# Patient Record
Sex: MALE | Race: WHITE | NOT HISPANIC OR LATINO | URBAN - METROPOLITAN AREA
[De-identification: names, ages, dates, MRNs, and addresses within clinical notes are randomized per-mention and may not be internally consistent; named-entity substitution may affect disease eponyms.]

---

## 2024-02-09 ENCOUNTER — APPOINTMENT (OUTPATIENT)
Dept: DERMATOLOGY | Facility: CLINIC | Age: 59
End: 2024-02-09
Payer: COMMERCIAL

## 2024-09-24 ENCOUNTER — OFFICE VISIT (OUTPATIENT)
Dept: URGENT CARE | Age: 59
End: 2024-09-24
Payer: COMMERCIAL

## 2024-09-24 VITALS
RESPIRATION RATE: 16 BRPM | DIASTOLIC BLOOD PRESSURE: 88 MMHG | HEART RATE: 67 BPM | BODY MASS INDEX: 32.14 KG/M2 | TEMPERATURE: 97.7 F | OXYGEN SATURATION: 97 % | SYSTOLIC BLOOD PRESSURE: 145 MMHG | HEIGHT: 66 IN | WEIGHT: 200 LBS

## 2024-09-24 DIAGNOSIS — L50.9 HIVES: Primary | ICD-10-CM

## 2024-09-24 RX ORDER — METHYLPREDNISOLONE 4 MG/1
TABLET ORAL
Qty: 21 TABLET | Refills: 0 | Status: SHIPPED | OUTPATIENT
Start: 2024-09-24 | End: 2024-10-01

## 2024-09-24 NOTE — PROGRESS NOTES
"Subjective   Patient ID: Jamal Pradhan is a 59 y.o. male. They present today with a chief complaint of Rash (Started in groin and now in armpits and chest.  C/o itching).    History of Present Illness  HPI  Pt is a 59 yr old male who presents  with rash started in his groin now under arms and on abd and neck.  He said his wife did change detergents but didn't think it would be from that.  No new foods or medications.  It started a few days ago after cutting the grass  Past Medical History  Allergies as of 09/24/2024    (No Known Allergies)       (Not in a hospital admission)       No past medical history on file.    No past surgical history on file.         Review of Systems  Review of Systems  Gen: No fatigue, fever, sweats.  Head: No headache, trauma.  Eyes: No vision loss, double vision, drainage, eye pain.  ENT: No hearing changes, pain, epistaxis, congestion  Cardiac: No chest pain  Pulmonary: No shortness of breath,  pleuritic pain,   Heme/lymph: No swollen glands  GI: No abdominal pain, nausea, vomiting, diarrhea  : No  dysuria, frequency, urgency, hematuria  Musculoskeletal: No limb pain, joint pain, back pain, joint swelling or stiffness.  Skin: + rashes, pruritus, lumps, lesions.  Neuro: No Numbness, tingling, or weakness.  Psych: No  anxiety     Review of systems is otherwise negative unless stated above or in history of present illness.                             Objective    Vitals:    09/24/24 1451   BP: 145/88   BP Location: Left arm   Patient Position: Sitting   BP Cuff Size: Adult long   Pulse: 67   Resp: 16   Temp: 36.5 °C (97.7 °F)   TempSrc: Oral   SpO2: 97%   Weight: 90.7 kg (200 lb)   Height: 1.676 m (5' 6\")     No LMP for male patient.    Physical Exam  General: Vital signs stable, Pt is alert, no acute distress  Eyes: Conjunctiva normal, PERRL, EOMs intact  HENMT: Normocephalic, atraumatic, external ears and nose normal, no scars or masses.  No mastoid tenderness. Trachea is midline. " No meningeal signs, negative Kernig and Brudzinski, moves neck freely.  No sinus tenderness  Resp: Respiratory effort is normal, no retractions, no stridor. Lungs CTA, no wheezes or rhonchi  CV: Heart is regular rate and rhythm.   Skin: + patchy rash to groins, axilla, abdomin and neck red and raised itchy in nature  Skel: full range of motion of upper and lower extremities.   Neuro: Normal gait, CN II-XII intact, no motor or sensory changes.  Psych: Alert and oriented ×3, judgment is appropriate, normal mood and affect   Procedures    Point of Care Test & Imaging Results from this visit  No results found for this visit on 09/24/24.   No results found.    Diagnostic study results (if any) were reviewed by [facility].    Assessment/Plan   Allergies, medications, history, and pertinent labs/EKGs/Imaging reviewed by FRANNIE Curtis-CNP.     Medical Decision Making  Based on history and physical exam, findings suggestive of contact dermatitis.  No evidence of bacterial infection or systematic allergic reaction. Will start on oral and topical treatments.  Encouraged to take a daily antihistamine and continue with symptomatic and supportive care measures.  Avoid heat exposures and don't scratch.  Follow-up with PCP in 4-5 days if symptoms persist, return with any new or worsening symptoms. Patient verbalized understanding and agreeable with plan.      Orders and Diagnoses  There are no diagnoses linked to this encounter.    Medical Admin Record      Patient disposition: Home    Electronically signed by [facility]  3:01 PM

## 2024-09-24 NOTE — PATIENT INSTRUCTIONS
Steroids were called into your pharmacy take as directed.  You may take zyrtec daily also.  If not better see dermatology

## 2024-10-15 ENCOUNTER — APPOINTMENT (OUTPATIENT)
Dept: PRIMARY CARE | Facility: CLINIC | Age: 59
End: 2024-10-15
Payer: COMMERCIAL

## 2025-01-06 ENCOUNTER — APPOINTMENT (OUTPATIENT)
Dept: DERMATOLOGY | Facility: CLINIC | Age: 60
End: 2025-01-06
Payer: COMMERCIAL

## 2025-01-06 DIAGNOSIS — L82.1 SEBORRHEIC KERATOSIS: ICD-10-CM

## 2025-01-06 DIAGNOSIS — L24.89 IRRITANT CONTACT DERMATITIS DUE TO OTHER AGENTS: ICD-10-CM

## 2025-01-06 DIAGNOSIS — L21.9 SEBORRHEIC DERMATITIS: ICD-10-CM

## 2025-01-06 DIAGNOSIS — D22.9 MULTIPLE BENIGN NEVI: Primary | ICD-10-CM

## 2025-01-06 PROCEDURE — 99214 OFFICE O/P EST MOD 30 MIN: CPT | Performed by: STUDENT IN AN ORGANIZED HEALTH CARE EDUCATION/TRAINING PROGRAM

## 2025-01-06 RX ORDER — KETOCONAZOLE 20 MG/G
CREAM TOPICAL
Qty: 60 G | Refills: 11 | Status: SHIPPED | OUTPATIENT
Start: 2025-01-06

## 2025-01-06 RX ORDER — KETOCONAZOLE 20 MG/G
1 CREAM TOPICAL
COMMUNITY
Start: 2019-05-24

## 2025-01-06 RX ORDER — METOPROLOL TARTRATE 25 MG/1
25 TABLET, FILM COATED ORAL 2 TIMES DAILY
COMMUNITY

## 2025-01-06 RX ORDER — AMLODIPINE BESYLATE 5 MG/1
5 TABLET ORAL 2 TIMES DAILY
COMMUNITY

## 2025-01-06 RX ORDER — ALBUTEROL SULFATE 90 UG/1
2 INHALANT RESPIRATORY (INHALATION) EVERY 4 HOURS PRN
COMMUNITY
Start: 2023-07-05

## 2025-01-06 RX ORDER — CLOBETASOL PROPIONATE 0.5 MG/G
CREAM TOPICAL 2 TIMES DAILY
COMMUNITY
Start: 2024-12-14

## 2025-01-06 ASSESSMENT — DERMATOLOGY PATIENT ASSESSMENT
HAVE YOU HAD OR DO YOU HAVE VASCULAR DISEASE: NO
DO YOU USE A TANNING BED: NO
DO YOU HAVE ANY NEW OR CHANGING LESIONS: NO
DO YOU USE SUNSCREEN: OCCASIONALLY
HAVE YOU HAD OR DO YOU HAVE A STAPH INFECTION: NO
ARE YOU AN ORGAN TRANSPLANT RECIPIENT: NO

## 2025-01-06 ASSESSMENT — DERMATOLOGY QUALITY OF LIFE (QOL) ASSESSMENT
RATE HOW BOTHERED YOU ARE BY EFFECTS OF YOUR SKIN PROBLEMS ON YOUR ACTIVITIES (EG, GOING OUT, ACCOMPLISHING WHAT YOU WANT, WORK ACTIVITIES OR YOUR RELATIONSHIPS WITH OTHERS): 0 - NEVER BOTHERED
WHAT SINGLE SKIN CONDITION LISTED BELOW IS THE PATIENT ANSWERING THE QUALITY-OF-LIFE ASSESSMENT QUESTIONS ABOUT: NONE OF THE ABOVE
DATE THE QUALITY-OF-LIFE ASSESSMENT WAS COMPLETED: 67211
ARE THERE EXCLUSIONS OR EXCEPTIONS FOR THE QUALITY OF LIFE ASSESSMENT: NO
RATE HOW BOTHERED YOU ARE BY SYMPTOMS OF YOUR SKIN PROBLEM (EG, ITCHING, STINGING BURNING, HURTING OR SKIN IRRITATION): 0 - NEVER BOTHERED
RATE HOW EMOTIONALLY BOTHERED YOU ARE BY YOUR SKIN PROBLEM (FOR EXAMPLE, WORRY, EMBARRASSMENT, FRUSTRATION): 0 - NEVER BOTHERED

## 2025-01-06 ASSESSMENT — ITCH NUMERIC RATING SCALE: HOW SEVERE IS YOUR ITCHING?: 0

## 2025-01-06 ASSESSMENT — PATIENT GLOBAL ASSESSMENT (PGA): PATIENT GLOBAL ASSESSMENT: PATIENT GLOBAL ASSESSMENT:  1 - CLEAR

## 2025-01-06 NOTE — PROGRESS NOTES
Subjective     Jamal Pradhan is a 59 y.o. male who presents for the following: Skin Check (FBSE, no areas of concern. HX of malignant melanoma) and Med Refill (Ketoconazole cream needed).     Review of Systems:  No other skin or systemic complaints other than what is documented elsewhere in the note.    The following portions of the chart were reviewed this encounter and updated as appropriate:         Skin Cancer History  No skin cancer on file.      Specialty Problems    None       Objective   Well appearing patient in no apparent distress; mood and affect are within normal limits.    A full examination was performed including scalp, head, eyes, ears, nose, lips, neck, chest, axillae, abdomen, back, buttocks, bilateral upper extremities, bilateral lower extremities, hands, feet, fingers, toes, fingernails, and toenails. All findings within normal limits unless otherwise noted below.    Assessment/Plan   1. Multiple benign nevi    Exam findings: Benign appearing macules and papules  Plan: monitor for any new or changing nevi. Notify me should this occur.  Over the counter use of sun screen product (30+ SPF with mineral sun screen) recommended      2. Seborrheic keratosis    Benign appearing seborrheic papules on trunk/extremities  Reassured, benign      3. Irritant contact dermatitis due to other agents  Chronic  Well controlled with RARE use of clobetasol (she would only use once or twice every few weeks)  Left Axilla, Right Axilla  Cleartoday  Recurrent dermatitis on axillary vault  Has been using clobetasol with goodresults  Discussed do not use for more thana few daysstraight  If not going away message me and ill sendsomething safer    4. Seborrheic dermatitis  Head - Anterior (Face), Right Abdomen (side) - Lower  Scaling reddish patches    Related Medications  ketoconazole (NIZOral) 2 % cream  To belly buttondaily as needed

## 2025-03-11 ENCOUNTER — APPOINTMENT (OUTPATIENT)
Dept: DERMATOLOGY | Facility: CLINIC | Age: 60
End: 2025-03-11
Payer: COMMERCIAL

## 2025-03-11 DIAGNOSIS — L60.9 NAIL ABNORMALITIES: Primary | ICD-10-CM

## 2025-03-11 PROCEDURE — 99213 OFFICE O/P EST LOW 20 MIN: CPT | Performed by: STUDENT IN AN ORGANIZED HEALTH CARE EDUCATION/TRAINING PROGRAM

## 2025-03-11 ASSESSMENT — ITCH NUMERIC RATING SCALE: HOW SEVERE IS YOUR ITCHING?: 0

## 2025-03-11 ASSESSMENT — PATIENT GLOBAL ASSESSMENT (PGA): PATIENT GLOBAL ASSESSMENT: PATIENT GLOBAL ASSESSMENT:  1 - CLEAR

## 2025-03-11 ASSESSMENT — DERMATOLOGY PATIENT ASSESSMENT
HAVE YOU HAD OR DO YOU HAVE A STAPH INFECTION: NO
DO YOU HAVE ANY NEW OR CHANGING LESIONS: YES
ARE YOU AN ORGAN TRANSPLANT RECIPIENT: NO
DO YOU USE SUNSCREEN: OCCASIONALLY
DO YOU USE A TANNING BED: NO
HAVE YOU HAD OR DO YOU HAVE VASCULAR DISEASE: YES

## 2025-03-11 ASSESSMENT — DERMATOLOGY QUALITY OF LIFE (QOL) ASSESSMENT
WHAT SINGLE SKIN CONDITION LISTED BELOW IS THE PATIENT ANSWERING THE QUALITY-OF-LIFE ASSESSMENT QUESTIONS ABOUT: NONE OF THE ABOVE
RATE HOW EMOTIONALLY BOTHERED YOU ARE BY YOUR SKIN PROBLEM (FOR EXAMPLE, WORRY, EMBARRASSMENT, FRUSTRATION): 0 - NEVER BOTHERED
RATE HOW BOTHERED YOU ARE BY EFFECTS OF YOUR SKIN PROBLEMS ON YOUR ACTIVITIES (EG, GOING OUT, ACCOMPLISHING WHAT YOU WANT, WORK ACTIVITIES OR YOUR RELATIONSHIPS WITH OTHERS): 0 - NEVER BOTHERED
ARE THERE EXCLUSIONS OR EXCEPTIONS FOR THE QUALITY OF LIFE ASSESSMENT: NO
DATE THE QUALITY-OF-LIFE ASSESSMENT WAS COMPLETED: 67275
RATE HOW BOTHERED YOU ARE BY SYMPTOMS OF YOUR SKIN PROBLEM (EG, ITCHING, STINGING BURNING, HURTING OR SKIN IRRITATION): 0 - NEVER BOTHERED

## 2025-03-11 NOTE — PROGRESS NOTES
Subjective     Jamal Pradhan is a 60 y.o. male who presents for the following: Nail Problem (Right thumb nail, been present for about 2 months. Not healing. ).     Review of Systems:  No other skin or systemic complaints other than what is documented elsewhere in the note.    The following portions of the chart were reviewed this encounter and updated as appropriate:          Skin Cancer History  No skin cancer on file.      Specialty Problems    None       Objective   Well appearing patient in no apparent distress; mood and affect are within normal limits.    A focused skin examination was performed. All findings within normal limits unless otherwise noted below.    Assessment/Plan   1. Nail abnormalities    Related Procedures  Referral to Dermatology - Mohs Surgery        Right medial aspect of nail plate  Thickened pinkish to skin colored band with distal reddish granulation tissue like area  Unclear of diagnosis  Low risk of malignancy- however, would like to rule out   Referral placed for derm surg

## 2026-01-06 ENCOUNTER — APPOINTMENT (OUTPATIENT)
Dept: DERMATOLOGY | Facility: CLINIC | Age: 61
End: 2026-01-06
Payer: COMMERCIAL